# Patient Record
Sex: FEMALE | Employment: STUDENT | URBAN - METROPOLITAN AREA
[De-identification: names, ages, dates, MRNs, and addresses within clinical notes are randomized per-mention and may not be internally consistent; named-entity substitution may affect disease eponyms.]

---
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
## 2020-09-11 ENCOUNTER — LAB REQUISITION (OUTPATIENT)
Age: 20
End: 2020-09-11
Payer: COMMERCIAL

## 2020-09-11 DIAGNOSIS — Z20.822 ENCOUNTER FOR SCREENING LABORATORY TESTING FOR COVID-19 VIRUS: ICD-10-CM

## 2020-09-14 LAB — SARS-COV-2 BY PCR: NOT DETECTED

## 2020-09-14 NOTE — PROGRESS NOTES
Results reviewed and noted to be negative. Appropriate [facility] personnel responsible for documenting and following-up with client notified of test results and need to communicate such results to the client.